# Patient Record
Sex: FEMALE | Race: WHITE | Employment: UNEMPLOYED | URBAN - METROPOLITAN AREA
[De-identification: names, ages, dates, MRNs, and addresses within clinical notes are randomized per-mention and may not be internally consistent; named-entity substitution may affect disease eponyms.]

---
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
## 2021-06-17 NOTE — PROGRESS NOTES
Marilyn spoke with me about her sleep. She has not had good sleep since being admitted. On average she awakens four times a night and has difficulty getting back to sleep. I talked to the resident and trazodone was ordered. Marilyn's father was notified and a message left for her mother stating it would be given tonight. She had the trazodone and melatonin at 2030 and appeared asleep by 2130.    37.3

## 2022-05-13 NOTE — PLAN OF CARE
Actively listened to self-chosen music from a selection for the purposes of grounding/centering, self-validation and relaxation/stress reduction.  Engaged.  Cooperative.  Focused on the music listening intervention.       5

## 2024-02-28 NOTE — PROGRESS NOTES
----- Message from Lilibeth Almanza sent at 2/28/2024 11:59 AM CST -----  Type:  Needs Medical Advice    Who Called: pt    Would the patient rather a call back or a response via [facility]? Call   Best Call Back Number: [phone]  Additional Information: is it okay to send pump back requesting a call back to discuss         Carolina is admitted to the Adolescent Crisis Unit via the [facility] accompanied by her mother and twin sister from home.  Carolina was here a few weeks ago with a plan to follow up her care in a program at her school, however she is not attending school and has not met with her school counselor more than two times.  She feels like her depression is worse over the past two weeks and she has thoughts of taking her sisters pills and some alcohol her mother has stored in the basement to overdose on.  Her mother states she has not slept for several nights because of how worried she is about her.  She states she feels relieved and safe to be back here. She can keep herself safe while in the hospital.  She did have her Lexapro increased to 20 mg yesterday by Keila Silverman.

## 2025-06-11 NOTE — PROGRESS NOTES
Treatment Plan Evaluation     Patient: Quita Green   MRN: [id number]  :2000    Age: 18 year old    Sex:female    Date: 19   Time: 9:00AM      Problem/Need List:   Depressive Symptoms, Suicidal Ideation, Anxiety with Panic Attacks and Disrupted Family Function       Narrative Summary Update of Status and Plan:  Pt has continues to be stable and show improved mood, self-care and increased hope. The treatment focus continues to help Pt develop ways to manage mood and create healthy emotional boundaries between her self and the family system. This week Pt and parent will be encouraged to attend a re-entry meeting with the school and attend a minimum of 1 transition day. The program therapist will continue to help facilitate the school transition and help Pt utilize interventions to manage anxiety. Discharge from program is projected the week of 3/4 - 3/8      Medication Evaluation:  Current Outpatient Medications   Medication Sig     cholecalciferol (VITAMIN D3 GUMMIES ADULT) 1000 units CHEW Take 2 chew tab by mouth daily     hydrOXYzine (ATARAX) 25 MG tablet Take 1 tablet (25 mg) by mouth every 6 hours as needed for anxiety or other (insomnia)     Omega-3 Fatty Acids (FISH OIL ADULT GUMMIES PO) Take 2 chew tab by mouth daily     sertraline (ZOLOFT) 100 MG tablet Take 2 tablets (200 mg) by mouth daily     traZODone (DESYREL) 50 MG tablet Take 1.5 tablets (75 mg) by mouth At Bedtime     No current facility-administered medications for this encounter.      Facility-Administered Medications Ordered in Other Encounters   Medication     acetaminophen (TYLENOL) tablet 650 mg     benzocaine-menthol (CEPACOL) 15-3.6 MG lozenge 1 lozenge     calcium carbonate (TUMS) chewable tablet 1,000 mg     ibuprofen (ADVIL/MOTRIN) tablet 400 mg         Physical Health:  Problem(s)/Plan:  None reported       Legal Court:  Status /Plan:  None  Patient called in wanting to know about a mask that was supposed to be shipped to him. I advised patient that he would need to contact [facility] in regards to mask. Patient wanted to speak with [employer] Liaison Allie. I advised that I could not connect them directly but I can send a email for Allie to call him.       Patient was agreeable with email being sent. Also provided patient with [facility] phone number [phone]      Email sent to Allie at [email or web address] requesting to call patient back regarding mask.    reported    Contributed to/Attended by:  Dr. Anup PAINTING; Ernestina Hernandez RN; Paulo Weiss LMFT